# Patient Record
Sex: FEMALE
[De-identification: names, ages, dates, MRNs, and addresses within clinical notes are randomized per-mention and may not be internally consistent; named-entity substitution may affect disease eponyms.]

---

## 2022-12-22 PROBLEM — Z00.00 ENCOUNTER FOR PREVENTIVE HEALTH EXAMINATION: Status: ACTIVE | Noted: 2022-12-22

## 2023-12-20 ENCOUNTER — RX ONLY (RX ONLY)
Age: 58
End: 2023-12-20

## 2023-12-20 ENCOUNTER — OFFICE (OUTPATIENT)
Dept: URBAN - METROPOLITAN AREA CLINIC 35 | Facility: CLINIC | Age: 58
Setting detail: OPHTHALMOLOGY
End: 2023-12-20
Payer: COMMERCIAL

## 2023-12-20 DIAGNOSIS — H25.13: ICD-10-CM

## 2023-12-20 DIAGNOSIS — H25.042: ICD-10-CM

## 2023-12-20 DIAGNOSIS — H16.223: ICD-10-CM

## 2023-12-20 DIAGNOSIS — H25.013: ICD-10-CM

## 2023-12-20 DIAGNOSIS — H43.391: ICD-10-CM

## 2023-12-20 PROBLEM — H52.7 REFRACTIVE ERROR: Status: ACTIVE | Noted: 2023-12-20

## 2023-12-20 PROCEDURE — 92004 COMPRE OPH EXAM NEW PT 1/>: CPT | Performed by: OPHTHALMOLOGY

## 2023-12-20 ASSESSMENT — REFRACTION_MANIFEST
OS_SPHERE: +1.25
OD_VA1: 20/20-
OS_AXIS: 085
OS_AXIS: 075
OD_SPHERE: +0.75
OS_VA1: 20/30-2
OS_VA1: 20/30+
OS_SPHERE: +0.25
OD_VA1: 20/20-2
OS_CYLINDER: -2.50
OD_CYLINDER: -1.75
OD_AXIS: 105
OD_SPHERE: +1.00
OD_AXIS: 105
OS_CYLINDER: -2.00
OD_CYLINDER: -2.50

## 2023-12-20 ASSESSMENT — REFRACTION_AUTOREFRACTION
OS_AXIS: 080
OS_SPHERE: +1.50
OD_AXIS: 104
OS_CYLINDER: -3.50
OD_SPHERE: +1.00
OD_CYLINDER: -2.50

## 2023-12-20 ASSESSMENT — CONFRONTATIONAL VISUAL FIELD TEST (CVF)
OD_FINDINGS: FULL
OS_FINDINGS: FULL

## 2023-12-20 ASSESSMENT — SPHEQUIV_DERIVED
OS_SPHEQUIV: -0.25
OD_SPHEQUIV: -0.25
OD_SPHEQUIV: -0.125
OS_SPHEQUIV: 0
OS_SPHEQUIV: -0.75
OD_SPHEQUIV: -0.25

## 2023-12-20 ASSESSMENT — DRY EYES - PHYSICIAN NOTES
OS_GENERALCOMMENTS: SPK INF
OD_GENERALCOMMENTS: SPK INF

## 2023-12-20 ASSESSMENT — REFRACTION_CURRENTRX
OD_VPRISM_DIRECTION: SV
OS_SPHERE: -0.75
OS_VPRISM_DIRECTION: SV
OS_AXIS: 066
OS_OVR_VA: 20/
OD_AXIS: 102
OD_SPHERE: -1.00
OD_CYLINDER: -0.75
OS_CYLINDER: -1.50
OD_OVR_VA: 20/
OS_OVR_VA: 20/
OD_OVR_VA: 20/

## 2024-01-18 ENCOUNTER — OFFICE (OUTPATIENT)
Dept: URBAN - METROPOLITAN AREA CLINIC 35 | Facility: CLINIC | Age: 59
Setting detail: OPHTHALMOLOGY
End: 2024-01-18
Payer: COMMERCIAL

## 2024-01-18 DIAGNOSIS — H52.7: ICD-10-CM

## 2024-01-18 DIAGNOSIS — H52.4: ICD-10-CM

## 2024-01-18 DIAGNOSIS — H25.013: ICD-10-CM

## 2024-01-18 DIAGNOSIS — H25.042: ICD-10-CM

## 2024-01-18 DIAGNOSIS — H43.391: ICD-10-CM

## 2024-01-18 DIAGNOSIS — H16.223: ICD-10-CM

## 2024-01-18 DIAGNOSIS — H25.13: ICD-10-CM

## 2024-01-18 PROCEDURE — 99213 OFFICE O/P EST LOW 20 MIN: CPT | Performed by: OPHTHALMOLOGY

## 2024-01-18 PROCEDURE — 92015 DETERMINE REFRACTIVE STATE: CPT | Performed by: OPHTHALMOLOGY

## 2024-01-18 PROCEDURE — 83861 MICROFLUID ANALY TEARS: CPT | Mod: QW | Performed by: OPHTHALMOLOGY

## 2024-01-18 PROCEDURE — 92134 CPTRZ OPH DX IMG PST SGM RTA: CPT | Performed by: OPHTHALMOLOGY

## 2024-01-18 ASSESSMENT — REFRACTION_MANIFEST
OS_CYLINDER: -2.50
OD_SPHERE: +1.00
OS_SPHERE: +1.25
OS_ADD: +2.00
OS_SPHERE: +0.25
OD_CYLINDER: -2.50
OD_CYLINDER: -1.75
OS_AXIS: 075
OD_AXIS: 105
OD_AXIS: 105
OS_CYLINDER: -2.50
OD_SPHERE: +0.75
OS_VA1: 20/30+
OD_SPHERE: +0.75
OS_CYLINDER: -2.00
OD_VA1: 20/20-
OD_ADD: +2.00
OS_VA1: 20/30
OS_AXIS: 085
OS_AXIS: 085
OS_VA1: 20/30-2
OS_SPHERE: +0.75
OD_VA1: 20/20-2
OD_CYLINDER: -2.25
OD_AXIS: 105
OD_VA1: 20/20

## 2024-01-18 ASSESSMENT — REFRACTION_AUTOREFRACTION
OS_CYLINDER: -3.50
OD_AXIS: 104
OS_SPHERE: +1.50
OD_SPHERE: +1.00
OD_CYLINDER: -2.50
OS_AXIS: 080

## 2024-01-18 ASSESSMENT — SPHEQUIV_DERIVED
OD_SPHEQUIV: -0.125
OD_SPHEQUIV: -0.375
OS_SPHEQUIV: -0.5
OD_SPHEQUIV: -0.25
OS_SPHEQUIV: -0.25
OS_SPHEQUIV: -0.75
OD_SPHEQUIV: -0.25
OS_SPHEQUIV: 0

## 2024-01-18 ASSESSMENT — REFRACTION_CURRENTRX
OS_OVR_VA: 20/
OS_AXIS: 066
OD_OVR_VA: 20/
OD_AXIS: 102
OD_SPHERE: -1.00
OD_VPRISM_DIRECTION: SV
OS_CYLINDER: -1.50
OS_VPRISM_DIRECTION: SV
OD_CYLINDER: -0.75
OS_SPHERE: -0.75

## 2024-01-18 ASSESSMENT — DRY EYES - PHYSICIAN NOTES
OS_GENERALCOMMENTS: SPK INF
OD_GENERALCOMMENTS: SPK INF

## 2024-01-18 ASSESSMENT — CONFRONTATIONAL VISUAL FIELD TEST (CVF)
OS_FINDINGS: FULL
OD_FINDINGS: FULL

## 2025-07-18 ENCOUNTER — EMERGENCY (EMERGENCY)
Facility: HOSPITAL | Age: 60
LOS: 1 days | End: 2025-07-18
Attending: EMERGENCY MEDICINE | Admitting: EMERGENCY MEDICINE
Payer: COMMERCIAL

## 2025-07-18 VITALS
OXYGEN SATURATION: 97 % | HEIGHT: 66 IN | WEIGHT: 110.01 LBS | RESPIRATION RATE: 18 BRPM | SYSTOLIC BLOOD PRESSURE: 166 MMHG | DIASTOLIC BLOOD PRESSURE: 77 MMHG | HEART RATE: 104 BPM | TEMPERATURE: 99 F

## 2025-07-18 DIAGNOSIS — K61.0 ANAL ABSCESS: ICD-10-CM

## 2025-07-18 LAB
ALBUMIN SERPL ELPH-MCNC: 3.4 G/DL — SIGNIFICANT CHANGE UP (ref 3.3–5)
ALP SERPL-CCNC: 301 U/L — HIGH (ref 40–120)
ALT FLD-CCNC: 21 U/L — SIGNIFICANT CHANGE UP (ref 12–78)
ANION GAP SERPL CALC-SCNC: 12 MMOL/L — SIGNIFICANT CHANGE UP (ref 5–17)
APTT BLD: 30.7 SEC — SIGNIFICANT CHANGE UP (ref 26.1–36.8)
AST SERPL-CCNC: 23 U/L — SIGNIFICANT CHANGE UP (ref 15–37)
BASOPHILS # BLD AUTO: 0.08 K/UL — SIGNIFICANT CHANGE UP (ref 0–0.2)
BASOPHILS NFR BLD AUTO: 0.4 % — SIGNIFICANT CHANGE UP (ref 0–2)
BILIRUB SERPL-MCNC: 0.5 MG/DL — SIGNIFICANT CHANGE UP (ref 0.2–1.2)
BUN SERPL-MCNC: 13 MG/DL — SIGNIFICANT CHANGE UP (ref 7–23)
CALCIUM SERPL-MCNC: 10.1 MG/DL — SIGNIFICANT CHANGE UP (ref 8.5–10.1)
CHLORIDE SERPL-SCNC: 95 MMOL/L — LOW (ref 96–108)
CO2 SERPL-SCNC: 27 MMOL/L — SIGNIFICANT CHANGE UP (ref 22–31)
CREAT SERPL-MCNC: 0.89 MG/DL — SIGNIFICANT CHANGE UP (ref 0.5–1.3)
EGFR: 75 ML/MIN/1.73M2 — SIGNIFICANT CHANGE UP
EGFR: 75 ML/MIN/1.73M2 — SIGNIFICANT CHANGE UP
EOSINOPHIL # BLD AUTO: 0.09 K/UL — SIGNIFICANT CHANGE UP (ref 0–0.5)
EOSINOPHIL NFR BLD AUTO: 0.4 % — SIGNIFICANT CHANGE UP (ref 0–6)
GLUCOSE SERPL-MCNC: 389 MG/DL — HIGH (ref 70–99)
HCT VFR BLD CALC: 35.6 % — SIGNIFICANT CHANGE UP (ref 34.5–45)
HGB BLD-MCNC: 12.4 G/DL — SIGNIFICANT CHANGE UP (ref 11.5–15.5)
IMM GRANULOCYTES # BLD AUTO: 0.2 K/UL — HIGH (ref 0–0.07)
IMM GRANULOCYTES NFR BLD AUTO: 0.9 % — SIGNIFICANT CHANGE UP (ref 0–0.9)
INR BLD: 1.09 RATIO — SIGNIFICANT CHANGE UP (ref 0.85–1.16)
LACTATE SERPL-SCNC: 1.4 MMOL/L — SIGNIFICANT CHANGE UP (ref 0.7–2)
LYMPHOCYTES # BLD AUTO: 1.64 K/UL — SIGNIFICANT CHANGE UP (ref 1–3.3)
LYMPHOCYTES NFR BLD AUTO: 7.4 % — LOW (ref 13–44)
MCHC RBC-ENTMCNC: 31.6 PG — SIGNIFICANT CHANGE UP (ref 27–34)
MCHC RBC-ENTMCNC: 34.8 G/DL — SIGNIFICANT CHANGE UP (ref 32–36)
MCV RBC AUTO: 90.6 FL — SIGNIFICANT CHANGE UP (ref 80–100)
MONOCYTES # BLD AUTO: 1.92 K/UL — HIGH (ref 0–0.9)
MONOCYTES NFR BLD AUTO: 8.6 % — SIGNIFICANT CHANGE UP (ref 2–14)
NEUTROPHILS # BLD AUTO: 18.32 K/UL — HIGH (ref 1.8–7.4)
NEUTROPHILS NFR BLD AUTO: 82.3 % — HIGH (ref 43–77)
NRBC # BLD AUTO: 0 K/UL — SIGNIFICANT CHANGE UP (ref 0–0)
NRBC # FLD: 0 K/UL — SIGNIFICANT CHANGE UP (ref 0–0)
NRBC BLD AUTO-RTO: 0 /100 WBCS — SIGNIFICANT CHANGE UP (ref 0–0)
PLATELET # BLD AUTO: 388 K/UL — SIGNIFICANT CHANGE UP (ref 150–400)
PMV BLD: 11.4 FL — SIGNIFICANT CHANGE UP (ref 7–13)
POTASSIUM SERPL-MCNC: 3.5 MMOL/L — SIGNIFICANT CHANGE UP (ref 3.5–5.3)
POTASSIUM SERPL-SCNC: 3.5 MMOL/L — SIGNIFICANT CHANGE UP (ref 3.5–5.3)
PROT SERPL-MCNC: 8.3 G/DL — SIGNIFICANT CHANGE UP (ref 6–8.3)
PROTHROM AB SERPL-ACNC: 12.8 SEC — SIGNIFICANT CHANGE UP (ref 9.9–13.4)
RBC # BLD: 3.93 M/UL — SIGNIFICANT CHANGE UP (ref 3.8–5.2)
RBC # FLD: 11.6 % — SIGNIFICANT CHANGE UP (ref 10.3–14.5)
SODIUM SERPL-SCNC: 134 MMOL/L — LOW (ref 135–145)
WBC # BLD: 22.25 K/UL — HIGH (ref 3.8–10.5)
WBC # FLD AUTO: 22.25 K/UL — HIGH (ref 3.8–10.5)

## 2025-07-18 PROCEDURE — 82962 GLUCOSE BLOOD TEST: CPT

## 2025-07-18 PROCEDURE — 96374 THER/PROPH/DIAG INJ IV PUSH: CPT | Mod: XU

## 2025-07-18 PROCEDURE — 85730 THROMBOPLASTIN TIME PARTIAL: CPT

## 2025-07-18 PROCEDURE — 72193 CT PELVIS W/DYE: CPT | Mod: 26

## 2025-07-18 PROCEDURE — 83605 ASSAY OF LACTIC ACID: CPT

## 2025-07-18 PROCEDURE — 46050 I&D PERIANAL ABSCESS SUPFC: CPT

## 2025-07-18 PROCEDURE — 85610 PROTHROMBIN TIME: CPT

## 2025-07-18 PROCEDURE — 96375 TX/PRO/DX INJ NEW DRUG ADDON: CPT | Mod: XU

## 2025-07-18 PROCEDURE — 99284 EMERGENCY DEPT VISIT MOD MDM: CPT | Mod: 25

## 2025-07-18 PROCEDURE — 87040 BLOOD CULTURE FOR BACTERIA: CPT

## 2025-07-18 PROCEDURE — 36415 COLL VENOUS BLD VENIPUNCTURE: CPT

## 2025-07-18 PROCEDURE — 85025 COMPLETE CBC W/AUTO DIFF WBC: CPT

## 2025-07-18 PROCEDURE — 72193 CT PELVIS W/DYE: CPT

## 2025-07-18 PROCEDURE — 99285 EMERGENCY DEPT VISIT HI MDM: CPT

## 2025-07-18 PROCEDURE — 80053 COMPREHEN METABOLIC PANEL: CPT

## 2025-07-18 PROCEDURE — 10060 I&D ABSCESS SIMPLE/SINGLE: CPT

## 2025-07-18 RX ORDER — LIDOCAINE HCL/PF 10 MG/ML
10 VIAL (ML) INJECTION ONCE
Refills: 0 | Status: DISCONTINUED | OUTPATIENT
Start: 2025-07-18 | End: 2025-07-22

## 2025-07-18 RX ORDER — CEFPODOXIME PROXETIL 200 MG/1
200 TABLET, FILM COATED ORAL ONCE
Refills: 0 | Status: COMPLETED | OUTPATIENT
Start: 2025-07-18 | End: 2025-07-18

## 2025-07-18 RX ORDER — ONDANSETRON HCL/PF 4 MG/2 ML
4 VIAL (ML) INJECTION ONCE
Refills: 0 | Status: COMPLETED | OUTPATIENT
Start: 2025-07-18 | End: 2025-07-18

## 2025-07-18 RX ORDER — METRONIDAZOLE 250 MG
1 TABLET ORAL
Qty: 30 | Refills: 0
Start: 2025-07-18 | End: 2025-07-27

## 2025-07-18 RX ORDER — CLINDAMYCIN PHOSPHATE 150 MG/ML
600 VIAL (ML) INJECTION ONCE
Refills: 0 | Status: DISCONTINUED | OUTPATIENT
Start: 2025-07-18 | End: 2025-07-22

## 2025-07-18 RX ORDER — METFORMIN HYDROCHLORIDE 850 MG/1
1 TABLET ORAL
Qty: 30 | Refills: 0
Start: 2025-07-18 | End: 2025-08-16

## 2025-07-18 RX ORDER — OXYCODONE HYDROCHLORIDE AND ACETAMINOPHEN 10; 325 MG/1; MG/1
1 TABLET ORAL
Qty: 12 | Refills: 0
Start: 2025-07-18 | End: 2025-07-20

## 2025-07-18 RX ORDER — POVIDONE-IODINE 7.5 %
1 SOLUTION, NON-ORAL TOPICAL ONCE
Refills: 0 | Status: COMPLETED | OUTPATIENT
Start: 2025-07-18 | End: 2025-07-18

## 2025-07-18 RX ORDER — POVIDONE-IODINE 7.5 %
1 SOLUTION, NON-ORAL TOPICAL ONCE
Refills: 0 | Status: DISCONTINUED | OUTPATIENT
Start: 2025-07-18 | End: 2025-07-22

## 2025-07-18 RX ORDER — METRONIDAZOLE 250 MG
500 TABLET ORAL ONCE
Refills: 0 | Status: COMPLETED | OUTPATIENT
Start: 2025-07-18 | End: 2025-07-18

## 2025-07-18 RX ORDER — CEFUROXIME SODIUM 1.5 G
1 VIAL (EA) INJECTION
Refills: 0
Start: 2025-07-18

## 2025-07-18 RX ORDER — CEFPODOXIME PROXETIL 200 MG/1
1 TABLET, FILM COATED ORAL
Qty: 20 | Refills: 0
Start: 2025-07-18 | End: 2025-07-27

## 2025-07-18 RX ORDER — LIDOCAINE HCL/EPINEPHRINE/PF 1 %-1:200K
100 AMPUL (ML) INJECTION ONCE
Refills: 0 | Status: COMPLETED | OUTPATIENT
Start: 2025-07-18 | End: 2025-07-18

## 2025-07-18 RX ADMIN — Medication 4 MILLIGRAM(S): at 17:14

## 2025-07-18 RX ADMIN — Medication 4 MILLIGRAM(S): at 20:55

## 2025-07-18 RX ADMIN — Medication 1 APPLICATION(S): at 22:06

## 2025-07-18 RX ADMIN — Medication 100 MILLILITER(S): at 22:05

## 2025-07-18 RX ADMIN — CEFPODOXIME PROXETIL 200 MILLIGRAM(S): 200 TABLET, FILM COATED ORAL at 23:41

## 2025-07-18 RX ADMIN — Medication 1000 MILLILITER(S): at 18:24

## 2025-07-18 RX ADMIN — Medication 100 MILLIGRAM(S): at 18:21

## 2025-07-18 NOTE — CONSULT NOTE ADULT - ASSESSMENT
This is a 60y year old Female presenting with PMHx of migraines and psoriatic arthritis (uses topical creams, no oral medications) presenting with complaint of pain and abscess of her right buttock since this Monday. CT scan notable for 4.1 x 3.1 x 4.5 cm complex perianal collection with surrounding inflammation in the medial aspect of the right buttock along the gluteal cleft, with involvement of the right external sphincter.   Labs with leukocytosis of 22.25, glucose of 389. Afebrile, tachy , /77.     Plan:  - Continue IV Abx  - NPO/IVF  - Pain management prn  - Trend fever curve, monitor vital signs  - Will likely require drainage of abscess  Case to be discussed with Dr. Goodwin, formal plan to follow

## 2025-07-18 NOTE — ED PROVIDER NOTE - PATIENT PORTAL LINK FT
You can access the FollowMyHealth Patient Portal offered by [facility] by registering at the following website: [email or web address]. By joining [facility]’s FollowMyHealth portal, you will also be able to view your health information using other applications (apps) compatible with our system.

## 2025-07-18 NOTE — ED ADULT TRIAGE NOTE - CHIEF COMPLAINT QUOTE
A&Ox4 ambulatory to triage with complaints of pilonidal cyst started on Sunday. denies fevers/chills.

## 2025-07-18 NOTE — ED PROVIDER NOTE - OBJECTIVE STATEMENT
60-year-old female with history of psoriatic arthritis (uses topical creams, no oral medications), migraines presents to the ED complaining of buttock abscess since Monday.  Patient states she noticed pain, redness and swelling to area which has worsened over the past few days.  Patient with difficulty sitting on buttock.  No previous history of abscess.  Denies fever, chills, chest pain, shortness of breath, abdominal pain, nausea, vomiting no drainage from abscess previously.

## 2025-07-18 NOTE — ED PROVIDER NOTE - CLINICAL SUMMARY MEDICAL DECISION MAKING FREE TEXT BOX
Patient is a 60-year-old female who has psoriatic arthritis, psoriatic rash.  Presents emergency room with right gluteal/buttocks pain with an abscess for the past 4 days.  She denies cough fever chills.  She denies hematemesis or hematochezia.  She denies rectal discharge.  Denies any trauma.  She denies any prior history of abscesses.  She denies abdominal pain.    On evaluation is a thin female awake alert and oriented in no distress.  HEENT is unremarkable.  No G/F/R.  Cardiopulmonary examination is normal.  Abdomen is soft and nontender without guarding or rebound.  Patient has on her skin on the right gluteal fold a perirectal abscess that is tender mildly fluctuant with surrounding erythema.  It is not become pointed yet.  According to the PA the digital rectal exam is nontender.  Without any evidence of infection going deeply.    Plan of care includes antibiotics, CT imaging to rule out deep space infection, labs, pain medicine and IV fluids and disposition accordingly.  Will obtain surgical consult if needed.  This chart was made with dictation software and may contain typographical errors.

## 2025-07-18 NOTE — ED ADULT NURSE NOTE - OBJECTIVE STATEMENT
Pt received in bed alert and oriented with the c/o  pilonidal cyst started on Sunday. As per MD's orders IV tierra placed blood specimen obtained and sent to the lab. Pt stable and meds given and tolerated well. Nursing care ongoing and safety maintained

## 2025-07-18 NOTE — ED ADULT NURSE NOTE - MUSCLE PAIN OR WEAKNESS
Left voicemail on patients home number with consult date, time, and location with Dr Ambrosio. Informed patient that visit could last 1.5 to 2.5 hours and to call clinic back with any questions     Roge ZAMORANO   yes

## 2025-07-18 NOTE — ED PROVIDER NOTE - SKIN WOUND TYPE
+ abscess to right inner buttock with central fluctuance, surrounding cellulitis. no involvement of rectum. + multiple psoriatic lesions to body and legs./abscess(s)

## 2025-07-18 NOTE — ED PROVIDER NOTE - PROGRESS NOTE DETAILS
I+D performed at bedside by surgery PAs, recommend dc on oral abx and follow up with colorectal on Monday. Will dc with metformin for hyperglycemia.

## 2025-07-18 NOTE — CONSULT NOTE ADULT - SUBJECTIVE AND OBJECTIVE BOX
Surgery Consultation    This is a 60y year old Female with PMHx of migraines and psoriatic arthritis (uses topical creams, no oral medications) presenting with complaint of pain and abscess of her right buttock which she first noticed this Monday. States she felt a firm area that was tender to touch and it has progressively gotten larger. She denies any recent trauma, big bites, or lacerations to the opal. States she has never had an abscess in the past. Denies any changes to her BM, last one was this morning. Last ate a Turkey sandwich around 12pm. Denies pain with BM or any diarrhea, constipation, or bloody stools. Denies fevers, chills, CP, SOB, nausea, vomiting, or abdominal pain.   Patient admits to history of prediabetes that was managed with diet and lifestyle, of note pt with elevated glucose in ED.   Surgical history notable for laparoscopic myomectomy about 15 years ago and Lipoma removal of L shoulder about 20 years ago.       PAST MEDICAL & SURGICAL HISTORY:    Allergies:  Orange (Unknown)  penicillin (Unknown)    Home Medications:      Family History:  FAMILY HISTORY:      ROS:  Constitutional: Denies fever, fatigue or weight loss.  Skin: +Psoriasis plaques, +abscess of R buttock  Eyes: Denies recent vision problems or eye pain.  ENT: Denies congestion, ear pain, or sore throat.  Endocrine: Denies thyroid problems.  Cardiovascular: Denies chest pain or palpation.  Respiratory: Denies cough, shortness of breath, congestion, or wheezing.  Gastrointestinal: Denies abdominal pain, nausea, vomiting, or diarrhea.   Genitourinary: Denies dysuria.  Musculoskeletal: Denies joint swelling.  Neurologic: Denies headache.      PHYSICAL EXAM:  GENERAL: No acute distress, well-developed  SKIN: Psoriatic plaques noted on b/l LE, back and buttocks  HEAD:  Atraumatic, Normocephalic  CHEST/LUNG: equal rise and fall of chest  HEART: +S1S2  ABDOMEN: Soft, non-tender, non-distended  PERINEUM: Approximately 4x4cm firm but fluctuant abscess of medial aspect of R buttock/intergluteal cleft with overlying erythema. +TTP to abscess. No areas of drainage. Rectal exam nontender with no abnormalities appreciated. Psoriasis plaques noted in gluteal cleft.  NEUROLOGY: responding appropriately, no focal deficits    Data:  T(C): 37.1 (07-18-25 @ 13:22), Max: 37.1 (07-18-25 @ 13:22)  HR: 104 (07-18-25 @ 13:22) (104 - 104)  BP: 166/77 (07-18-25 @ 13:22) (166/77 - 166/77)  RR: 18 (07-18-25 @ 13:22) (18 - 18)  SpO2: 97% (07-18-25 @ 13:22) (97% - 97%)                        12.4   22.25 )-----------( 388      ( 18 Jul 2025 17:05 )             35.6     07-18    134[L]  |  95[L]  |  13  ----------------------------<  389[H]  3.5   |  27  |  0.89    Ca    10.1      18 Jul 2025 17:05    TPro  8.3  /  Alb  3.4  /  TBili  0.5  /  DBili  x   /  AST  23  /  ALT  21  /  AlkPhos  301[H]  07-18      LIVER FUNCTIONS - ( 18 Jul 2025 17:05 )  Alb: 3.4 g/dL / Pro: 8.3 g/dL / ALK PHOS: 301 U/L / ALT: 21 U/L / AST: 23 U/L / GGT: x           Urinalysis Basic - ( 18 Jul 2025 17:05 )    Color: x / Appearance: x / SG: x / pH: x  Gluc: 389 mg/dL / Ketone: x  / Bili: x / Urobili: x   Blood: x / Protein: x / Nitrite: x   Leuk Esterase: x / RBC: x / WBC x   Sq Epi: x / Non Sq Epi: x / Bacteria: x      Radiology:  < from: CT Pelvis w/ IV Cont (07.18.25 @ 17:42) >  ACC: [id number] EXAM:  CT PELVIS ONLY IC   ORDERED BY: STEVEN COHN     PROCEDURE DATE:  07/18/2025          INTERPRETATION:  CLINICAL INFORMATION: Perirectal abscess    COMPARISON: None.    CONTRAST/COMPLICATIONS:  IV Contrast: Omnipaque 350  90 cc administered   10 cc discarded  Oral Contrast: NONE.    PROCEDURE:  CT of the Pelvis was performed.  Sagittal and coronal reformats were performed.    FINDINGS:  BLADDER: Within normal limits.  REPRODUCTIVE ORGANS: Fibroid uterus.  LYMPH NODES: Mildly enlarged bilateral groin lymph nodes measuring up to   1.3 cm short axis on the right (301-76).    VISUALIZED PORTIONS:  BOWEL: No bowel obstruction. There is a 4.1 x 3.1 x 4.5 cm complex   perianal collection with surrounding inflammation in the medial aspect of   the right buttock along the gluteal cleft, with involvement of the right   external sphincter.      PERITONEUM/RETROPERITONEUM: Within normal limits.  VESSELS: Within normal limits.  ABDOMINAL WALL: Within normal limits.  BONES: Within normal limits.    IMPRESSION:  4.5 cm right perianal abscess with involvement of the right external   sphincter.    Mildly enlarged bilateral groin lymph nodes, likely reactive.        --- End of Report ---             SUYAPA MEZA MD; Attending Radiologist  This document has been electronically signed. Jul 18 2025  6:26PM    < end of copied text >

## 2025-07-21 ENCOUNTER — APPOINTMENT (OUTPATIENT)
Dept: COLORECTAL SURGERY | Facility: CLINIC | Age: 60
End: 2025-07-21
Payer: COMMERCIAL

## 2025-07-21 VITALS
SYSTOLIC BLOOD PRESSURE: 166 MMHG | WEIGHT: 115 LBS | HEART RATE: 108 BPM | BODY MASS INDEX: 18.48 KG/M2 | TEMPERATURE: 97.3 F | HEIGHT: 66 IN | RESPIRATION RATE: 16 BRPM | DIASTOLIC BLOOD PRESSURE: 79 MMHG

## 2025-07-21 DIAGNOSIS — Z98.890 OTHER SPECIFIED POSTPROCEDURAL STATES: ICD-10-CM

## 2025-07-21 DIAGNOSIS — Z78.9 OTHER SPECIFIED HEALTH STATUS: ICD-10-CM

## 2025-07-21 PROCEDURE — 99204 OFFICE O/P NEW MOD 45 MIN: CPT

## 2025-07-21 RX ORDER — OXYCODONE 5 MG/1
5 TABLET ORAL
Qty: 12 | Refills: 0 | Status: ACTIVE | COMMUNITY
Start: 2025-07-21 | End: 1900-01-01

## 2025-07-23 PROBLEM — Z78.9 DENIES ALCOHOL CONSUMPTION: Status: ACTIVE | Noted: 2025-07-23

## 2025-07-23 RX ORDER — METFORMIN HYDROCHLORIDE 750 MG/1
TABLET ORAL
Refills: 0 | Status: ACTIVE | COMMUNITY

## 2025-07-23 RX ORDER — BUTALB/ACETAMINOPHEN/CAFFEINE 50-325-40
TABLET ORAL
Refills: 0 | Status: ACTIVE | COMMUNITY

## 2025-07-23 RX ORDER — RIMEGEPANT SULFATE 75 MG/75MG
TABLET, ORALLY DISINTEGRATING ORAL
Refills: 0 | Status: ACTIVE | COMMUNITY

## 2025-07-23 RX ORDER — METRONIDAZOLE 500 MG/1
TABLET, FILM COATED ORAL
Refills: 0 | Status: ACTIVE | COMMUNITY

## 2025-07-24 LAB
CULTURE RESULTS: SIGNIFICANT CHANGE UP
CULTURE RESULTS: SIGNIFICANT CHANGE UP
SPECIMEN SOURCE: SIGNIFICANT CHANGE UP
SPECIMEN SOURCE: SIGNIFICANT CHANGE UP

## 2025-08-04 ENCOUNTER — NON-APPOINTMENT (OUTPATIENT)
Age: 60
End: 2025-08-04

## 2025-08-05 ENCOUNTER — APPOINTMENT (OUTPATIENT)
Dept: COLORECTAL SURGERY | Facility: CLINIC | Age: 60
End: 2025-08-05
Payer: COMMERCIAL

## 2025-08-05 DIAGNOSIS — K61.0 ANAL ABSCESS: ICD-10-CM

## 2025-08-05 PROCEDURE — 99213 OFFICE O/P EST LOW 20 MIN: CPT
